# Patient Record
Sex: FEMALE
[De-identification: names, ages, dates, MRNs, and addresses within clinical notes are randomized per-mention and may not be internally consistent; named-entity substitution may affect disease eponyms.]

---
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
## 2023-02-03 ENCOUNTER — APPOINTMENT (OUTPATIENT)
Dept: ULTRASOUND IMAGING | Facility: CLINIC | Age: 18
End: 2023-02-03

## 2023-02-17 ENCOUNTER — APPOINTMENT (OUTPATIENT)
Dept: ULTRASOUND IMAGING | Facility: CLINIC | Age: 18
End: 2023-02-17
Payer: COMMERCIAL

## 2023-02-17 ENCOUNTER — OUTPATIENT (OUTPATIENT)
Dept: OUTPATIENT SERVICES | Facility: HOSPITAL | Age: 18
LOS: 1 days | End: 2023-02-17

## 2023-02-17 PROCEDURE — 76641 ULTRASOUND BREAST COMPLETE: CPT | Mod: 26,LT

## 2023-03-01 ENCOUNTER — APPOINTMENT (OUTPATIENT)
Dept: ULTRASOUND IMAGING | Facility: CLINIC | Age: 18
End: 2023-03-01
Payer: COMMERCIAL

## 2023-03-01 ENCOUNTER — TRANSCRIPTION ENCOUNTER (OUTPATIENT)
Age: 18
End: 2023-03-01

## 2023-03-01 ENCOUNTER — OUTPATIENT (OUTPATIENT)
Dept: OUTPATIENT SERVICES | Facility: HOSPITAL | Age: 18
LOS: 1 days | End: 2023-03-01

## 2023-03-01 PROCEDURE — 19083 BX BREAST 1ST LESION US IMAG: CPT | Mod: LT

## 2023-03-01 PROCEDURE — 88305 TISSUE EXAM BY PATHOLOGIST: CPT | Mod: 26

## 2023-03-02 LAB — SURGICAL PATHOLOGY STUDY: SIGNIFICANT CHANGE UP

## 2023-03-10 ENCOUNTER — APPOINTMENT (OUTPATIENT)
Dept: ENDOCRINOLOGY | Facility: CLINIC | Age: 18
End: 2023-03-10
Payer: COMMERCIAL

## 2023-03-10 VITALS
BODY MASS INDEX: 20.49 KG/M2 | HEIGHT: 64 IN | WEIGHT: 120 LBS | HEART RATE: 70 BPM | SYSTOLIC BLOOD PRESSURE: 102 MMHG | DIASTOLIC BLOOD PRESSURE: 68 MMHG

## 2023-03-10 DIAGNOSIS — Z83.49 FAMILY HISTORY OF OTHER ENDOCRINE, NUTRITIONAL AND METABOLIC DISEASES: ICD-10-CM

## 2023-03-10 DIAGNOSIS — Z78.9 OTHER SPECIFIED HEALTH STATUS: ICD-10-CM

## 2023-03-10 DIAGNOSIS — Z80.1 FAMILY HISTORY OF MALIGNANT NEOPLASM OF TRACHEA, BRONCHUS AND LUNG: ICD-10-CM

## 2023-03-10 PROCEDURE — 99204 OFFICE O/P NEW MOD 45 MIN: CPT | Mod: GC

## 2023-03-10 RX ORDER — AZITHROMYCIN 250 MG/1
250 TABLET, FILM COATED ORAL
Qty: 6 | Refills: 0 | Status: COMPLETED | COMMUNITY
Start: 2022-03-09 | End: 2023-03-10

## 2023-03-10 RX ORDER — CLOTRIMAZOLE AND BETAMETHASONE DIPROPIONATE 10; .5 MG/G; MG/G
1-0.05 CREAM TOPICAL
Qty: 45 | Refills: 0 | Status: COMPLETED | COMMUNITY
Start: 2022-05-20 | End: 2023-03-10

## 2023-03-10 RX ORDER — TRETINOIN 0.25 MG/G
0.03 CREAM TOPICAL
Qty: 45 | Refills: 0 | Status: COMPLETED | COMMUNITY
Start: 2022-03-10 | End: 2023-03-10

## 2023-03-10 RX ORDER — ESCITALOPRAM OXALATE 5 MG/1
TABLET, FILM COATED ORAL
Refills: 0 | Status: COMPLETED | COMMUNITY
End: 2023-03-10

## 2023-03-10 RX ORDER — NORETHINDRONE ACETATE AND ETHINYL ESTRADIOL 1; .02 MG/1; MG/1
1-20 TABLET ORAL DAILY
Qty: 3 | Refills: 3 | Status: COMPLETED | COMMUNITY
End: 2023-03-10

## 2023-03-10 NOTE — REVIEW OF SYSTEMS
[Fatigue] : no fatigue [Recent Weight Gain (___ Lbs)] : no recent weight gain [Recent Weight Loss (___ Lbs)] : no recent weight loss [Fever] : no fever [Chills] : no chills [Visual Field Defect] : no visual field defect [Blurred Vision] : no blurred vision [Dysphagia] : no dysphagia [Dysphonia] : no dysphonia [Nausea] : no nausea [Constipation] : no constipation [Vomiting] : no vomiting [Diarrhea] : no diarrhea [Irregular Menses] : regular menses [Headaches] : no headaches [Tremors] : no tremors

## 2023-03-10 NOTE — PHYSICAL EXAM
[Alert] : alert [Well Nourished] : well nourished [No Acute Distress] : no acute distress [Well Developed] : well developed [Normal Sclera/Conjunctiva] : normal sclera/conjunctiva [EOMI] : extra ocular movement intact [No Proptosis] : no proptosis [Normal Oropharynx] : the oropharynx was normal [Supple] : the neck was supple [Thyroid Not Enlarged] : the thyroid was not enlarged [No Thyroid Nodules] : no palpable thyroid nodules [No Respiratory Distress] : no respiratory distress [No Accessory Muscle Use] : no accessory muscle use [Clear to Auscultation] : lungs were clear to auscultation bilaterally [Normal S1, S2] : normal S1 and S2 [Normal Rate] : heart rate was normal [Regular Rhythm] : with a regular rhythm [No Edema] : no peripheral edema [Normal Bowel Sounds] : normal bowel sounds [Not Tender] : non-tender [Not Distended] : not distended [Soft] : abdomen soft [No Rash] : no rash [Normal Reflexes] : deep tendon reflexes were 2+ and symmetric [Oriented x3] : oriented to person, place, and time [No Stigmata of Cushings Syndrome] : no stigmata of Cushings Syndrome [de-identified] : (+) Mild hand tremor

## 2023-03-10 NOTE — ASSESSMENT
[FreeTextEntry1] : Ms. De Santiago is a 17-year-old female with past medical history of depression (now on remission) who presented to clinic to establish care after she was found to have a 4-mm pituitary lesion in MRI. \par \par # Pituitary lesion\par - MRI of her brain (12/15/2022) showing a normal size of sella turcica and pituitary gland, with a 4 mm lesion within the posterior midline of the pituitary gland, superiorly, no suprasellar extension. Midline infundibulum, Unremarkable suprasellar cistern, optic chiasm and cavernous sinuses. \par - Hormonal testing didn't reveal any significant abnormalities. She has regular menstrual cycles. Disrupted circadian rhythm likely due to poor sleep hygiene and excess caffeine intake. \par - Recheck testosterone, estradiol, TFTs, ACTH and cortisol. \par - Repeat MRI 1 year after previous study. \par \par RTC in December 2023. \par

## 2023-03-10 NOTE — HISTORY OF PRESENT ILLNESS
[FreeTextEntry1] : Sima De Santiago is a 17-year-old female with no known past medical history who presented to clinic after she was found to have a pituitary lesion in MRI. The patient says that she was experiencing frequent conjunctival injection (3 times over 1 year and a half). She was evaluated by an ophthalmologist at [facility] who obtained an MRI of her brain (12/15/2022) which described a normal size of sella turcica and pituitary gland, with a 4 mm lesion within the posterior midline of the pituitary gland, superiorly, no suprasellar extension. Midline infundibulum, Unremarkable suprasellar cistern, optic chiasm and cavernous sinuses. \par \par The patient was seen by her primary care who obtained labs (1/26/23) showing:\par - Total testosterone 46 (Ref 2-45).\par - TSH 1.10 (Ref 0.5-4.3)\par - Free T4: 1.5 (Ref 0.8-1.4)\par - Estradiol: 507 \par - ACTH 7 (Ref 6-50)\par - Cortisol 6.8 (Collected at 8AM)\par - IGF-1 244 \par - FSH 17.8.\par - LH 72.8.\par - Prolactin 27.9 (Ref 3-30)\par - A1C: 4.7\par \par The patient says that she has been experiencing problems falling sleep. She reports always having problems falling sleep, but it has worsened over the past 1.5 year, now falling sleep around 3-4 AM. She usually wakes up one hour before her classes, which start either at 11 AM or 2 PM. If she could, she would eventually wake up around 1 PM. She was diagnosed with depression 2 years ago, and was treated with lexapro which she took for some time, and stopped on Nov 2021. She reports having increased anxiety but that has been going on as long as she can remember. She has regular menstrual cycles every ~3 weeks, last for 4-5 days. She denied using any birth control method. She used an OCP in the past discontinued approximately 1 year ago, has not been taking it anymore. She was also recently diagnosed with a fibroadenoma in her breast which was recently biopsied without any malignant features. \par \par She denied experiencing any changes in her weight, tremors, diarrhea. Her mother's family side has thyroid disease, great-grandmother had graves disease and aunt and uncle have hypothyroidism. Otherwise, she denied having any other autoimmune conditions in the family. She denied taking amiodarone or lithium. Denied radiation exposure. She usually drinks a lot of caffeine, the equivalent of ~4 expresso shots, gets headaches without it.

## 2023-03-13 ENCOUNTER — NON-APPOINTMENT (OUTPATIENT)
Age: 18
End: 2023-03-13

## 2023-03-14 LAB
ACTH SER-ACNC: 18.6 PG/ML
CORTIS SERPL-MCNC: 10.3 UG/DL
ESTRADIOL SERPL-MCNC: 43 PG/ML
T3 SERPL-MCNC: 146 NG/DL
T4 FREE SERPL-MCNC: 1.5 NG/DL
T4 SERPL-MCNC: 9.5 UG/DL
TESTOST FREE SERPL-MCNC: 2.4 PG/ML
TESTOST SERPL-MCNC: 42.8 NG/DL
TSH SERPL-ACNC: 1 UIU/ML

## 2023-03-16 ENCOUNTER — NON-APPOINTMENT (OUTPATIENT)
Age: 18
End: 2023-03-16

## 2023-06-15 ENCOUNTER — APPOINTMENT (OUTPATIENT)
Dept: OBGYN | Facility: CLINIC | Age: 18
End: 2023-06-15
Payer: COMMERCIAL

## 2023-06-15 VITALS
HEIGHT: 64 IN | BODY MASS INDEX: 20.49 KG/M2 | SYSTOLIC BLOOD PRESSURE: 108 MMHG | WEIGHT: 120 LBS | DIASTOLIC BLOOD PRESSURE: 70 MMHG

## 2023-06-15 LAB
HCG UR QL: NEGATIVE
QUALITY CONTROL: YES

## 2023-06-15 PROCEDURE — 99395 PREV VISIT EST AGE 18-39: CPT

## 2023-06-15 PROCEDURE — 81025 URINE PREGNANCY TEST: CPT

## 2023-06-15 NOTE — HISTORY OF PRESENT ILLNESS
[FreeTextEntry1] : ALONDRA THOMPSON is a 17 year F G0  here for annual. 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 Patient is sexually active with different partner from last annual - denies any issues. Denies discahrge, pain. She reports q6months she can get a little bump on her vulva that goes away after a week- currently does not have it. \par Reports stoppng ocps and then restarting it two months - bleeding twice a month the last two months\par \par BC: junel 1/20\par Gynhx: Reg menses, No h/o STIs/fibroids/cysts/abn paps, columba has h/o left breast cyst who she follows with pediatric surgeon at [facility]. It was never biopsied, just followed by sonogram and was stable in size.\par Obhx:nullip

## 2023-06-15 NOTE — DISCUSSION/SUMMARY
[FreeTextEntry1] : annual\par decline exam\par gc/ch sent\par rx for junel 1/20 sent\par aub likely from OCP restart - if not improved in 2 months - advised patient to reach out\par for vulvar bump - advised to make an apppointment when it is present for it to be evaluated

## 2023-07-11 ENCOUNTER — NON-APPOINTMENT (OUTPATIENT)
Age: 18
End: 2023-07-11

## 2023-12-13 ENCOUNTER — APPOINTMENT (OUTPATIENT)
Dept: ENDOCRINOLOGY | Facility: CLINIC | Age: 18
End: 2023-12-13
Payer: COMMERCIAL

## 2023-12-13 VITALS — DIASTOLIC BLOOD PRESSURE: 70 MMHG | SYSTOLIC BLOOD PRESSURE: 115 MMHG | HEART RATE: 80 BPM | WEIGHT: 119 LBS

## 2023-12-13 DIAGNOSIS — Z11.3 ENCOUNTER FOR SCREENING FOR INFECTIONS WITH A PREDOMINANTLY SEXUAL MODE OF TRANSMISSION: ICD-10-CM

## 2023-12-13 DIAGNOSIS — Z00.00 ENCOUNTER FOR GENERAL ADULT MEDICAL EXAMINATION W/OUT ABNORMAL FINDINGS: ICD-10-CM

## 2023-12-13 DIAGNOSIS — Z01.419 ENCOUNTER FOR GYNECOLOGICAL EXAMINATION (GENERAL) (ROUTINE) W/OUT ABNORMAL FINDINGS: ICD-10-CM

## 2023-12-13 PROCEDURE — 99214 OFFICE O/P EST MOD 30 MIN: CPT | Mod: GC

## 2023-12-13 NOTE — PHYSICAL EXAM
[Alert] : alert [Well Nourished] : well nourished [No Acute Distress] : no acute distress [Well Developed] : well developed [Normal Sclera/Conjunctiva] : normal sclera/conjunctiva [EOMI] : extra ocular movement intact [Visual Fields Grossly Intact] : visual fields grossly intact [No Proptosis] : no proptosis [Normal Oropharynx] : the oropharynx was normal [Supple] : the neck was supple [Thyroid Not Enlarged] : the thyroid was not enlarged [No Thyroid Nodules] : no palpable thyroid nodules [No Respiratory Distress] : no respiratory distress [No Accessory Muscle Use] : no accessory muscle use [Clear to Auscultation] : lungs were clear to auscultation bilaterally [Normal S1, S2] : normal S1 and S2 [Normal Rate] : heart rate was normal [Regular Rhythm] : with a regular rhythm [No Edema] : no peripheral edema [Normal Bowel Sounds] : normal bowel sounds [Not Tender] : non-tender [Not Distended] : not distended [Soft] : abdomen soft [No Stigmata of Cushings Syndrome] : no stigmata of Cushings Syndrome [No Rash] : no rash [Oriented x3] : oriented to person, place, and time

## 2023-12-20 NOTE — HISTORY OF PRESENT ILLNESS
[FreeTextEntry1] : Sima De Santiago is a 18-year-old female with a pituitary lesion who presented to the clinic for follow-up.   HISTORY OF PITUITARY LESION  The patient says that she was experiencing frequent conjunctival injection (3 times over 1 year and a half). She was evaluated by an ophthalmologist at [facility] who obtained an MRI of her brain (12/15/2022), which described a normal size of sella turcica and pituitary gland, with a 4 mm lesion within the posterior midline of the pituitary gland, superiorly, no suprasellar extension. Midline infundibulum, Unremarkable suprasellar cistern, optic chiasm, and cavernous sinuses.   During her last visit, she reported having some problems falling asleep; however, her disrupted circadian rhythm was thought to be likely due to poor sleep hygiene and excess caffeine intake. Her menstrual cycles were regular. Evaluation of her pituitary hormones didn't reveal any excess or deficiency. The plan was for her to repeat an MRI 1 year after her previous study to ensure the stability of the lesion.   Today, she reports that she started birth control pills for the past 6 months and stopped having her menstrual periods around July 2023. She denied having any vision changes on galactorrhea. Denied having excessive urination or headaches. She doesn't have any cushingoid features.

## 2023-12-20 NOTE — ASSESSMENT
[FreeTextEntry1] : # Pituitary lesion - MRI of her brain (12/15/2022) showing a normal size of sella turcica and pituitary gland, with a 4 mm lesion within the posterior midline of the pituitary gland, superiorly, no suprasellar extension. Midline infundibulum, Unremarkable suprasellar cistern, optic chiasm, and cavernous sinuses. - Hormonal testing didn't reveal any significant abnormalities. She reports starting birth control pills since her last visit and stopped having menstrual periods around July 2023. She denied having any vision changes on galactorrhea. Denied having excessive urination or headaches. She doesn't have any cushingoid features. - Recheck ACTH, Cortisol, IGF-1, TSH, and prolactin.  - Check an MRI pituitary with and without contrast.   Case discussed with Dr. Ch.  [facility] in 3 months.

## 2023-12-20 NOTE — REVIEW OF SYSTEMS
[Fatigue] : no fatigue [Decreased Appetite] : appetite not decreased [Recent Weight Gain (___ Lbs)] : no recent weight gain [Recent Weight Loss (___ Lbs)] : no recent weight loss [Fever] : no fever [Visual Field Defect] : no visual field defect [Chest Pain] : no chest pain [Palpitations] : no palpitations [Shortness Of Breath] : no shortness of breath [Polyuria] : no polyuria [Headaches] : no headaches

## 2023-12-20 NOTE — END OF VISIT
[] : Fellow [FreeTextEntry3] : Hx of pituitary incidentaloma, a 4 mm lesion within the posterior midline. Anterior pituitary hormones levels are normal. Plan as outlined by Dr. Washington in his A/P [Time Spent: ___ minutes] : I have spent [unfilled] minutes of time on the encounter.

## 2024-02-16 ENCOUNTER — NON-APPOINTMENT (OUTPATIENT)
Age: 19
End: 2024-02-16

## 2024-04-22 RX ORDER — NORETHINDRONE ACETATE AND ETHINYL ESTRADIOL 1; 20 MG/1; UG/1
1-20 TABLET ORAL
Qty: 1 | Refills: 2 | Status: ACTIVE | COMMUNITY
Start: 2023-06-15

## 2024-04-24 ENCOUNTER — APPOINTMENT (OUTPATIENT)
Dept: ENDOCRINOLOGY | Facility: CLINIC | Age: 19
End: 2024-04-24
Payer: COMMERCIAL

## 2024-04-24 VITALS — SYSTOLIC BLOOD PRESSURE: 113 MMHG | DIASTOLIC BLOOD PRESSURE: 75 MMHG | HEART RATE: 93 BPM | WEIGHT: 119 LBS

## 2024-04-24 DIAGNOSIS — Z30.09 ENCOUNTER FOR OTHER GENERAL COUNSELING AND ADVICE ON CONTRACEPTION: ICD-10-CM

## 2024-04-24 DIAGNOSIS — E24.9 CUSHING'S SYNDROME, UNSPECIFIED: ICD-10-CM

## 2024-04-24 DIAGNOSIS — R79.89 OTHER SPECIFIED ABNORMAL FINDINGS OF BLOOD CHEMISTRY: ICD-10-CM

## 2024-04-24 DIAGNOSIS — E23.7 DISORDER OF PITUITARY GLAND, UNSPECIFIED: ICD-10-CM

## 2024-04-24 PROCEDURE — 99213 OFFICE O/P EST LOW 20 MIN: CPT | Mod: GC

## 2024-04-24 RX ORDER — DEXAMETHASONE 1 MG/1
1 TABLET ORAL
Qty: 1 | Refills: 0 | Status: ACTIVE | COMMUNITY
Start: 2024-04-24 | End: 1900-01-01

## 2024-04-25 PROBLEM — Z30.09 BIRTH CONTROL COUNSELING: Status: ACTIVE | Noted: 2021-05-06

## 2024-04-25 PROBLEM — R79.89 ELEVATED PROLACTIN LEVEL: Status: ACTIVE | Noted: 2024-02-16

## 2024-05-01 NOTE — END OF VISIT
[] : Fellow [FreeTextEntry3] : Young woman wih hx of a 4 mm lesion within the posterior midline of the pituitary gland described as possible Rathke's cyst versus cystic adenoma. Suspected hypercortisolism. Plan A 24-hour urine free cortisol [Time Spent: ___ minutes] : I have spent [unfilled] minutes of time on the encounter.

## 2024-05-01 NOTE — ASSESSMENT
[FreeTextEntry1] : Ms. De Santiago is a 18-year-old female with a pituitary lesion who presented to the clinic for follow-up.   # Pituitary lesion / Rule-out Cushing syndrome - She had an incidental finding of a 4 mm lesion within the posterior midline of the pituitary gland described as possible Rathke's cyst versus cystic adenoma while undergoing workup for recurrent conjunctival injections. The lesion has remained stable over the past year. Her initial hormonal work-up was negative for any hormonal excess or deficiency. However, upon repeat testing, labs are now remarkable for elevated levels of random cortisol and ACTH. A 24-hour urine free cortisol was collected as the patient was on birth control pills with results still pending (because serum assays measure total cortisol, and false-positive rates for the overnight dexamethasone suppression test are seen in 50% of women taking the oral contraceptive pills).  - The elevated random ACTH and cortisol levels suggest possible autonomous cortisol secretion. The results of the 24-hour urine free cortisol are still pending. Nonetheless, if this is elevated, the persistently high levels of ACTH would suggest the diagnosis of ACTH-mediated Cushing syndrome, likely originating from the pituitary lesion.  - I instructed the patient to stop birth control pills (and to switch to barrier contraception) to ensure that further testing is accurate. Obtain a 1 mg dexamethasone suppression test and 2 late-night salivary cortisol levels 6 weeks after stopping oral contraceptive pills. Additionally, check IGF-1 levels.   Case discussed with Dr. Ch.  C in 2 months.

## 2024-05-01 NOTE — REVIEW OF SYSTEMS
[Anxiety] : anxiety [Fatigue] : no fatigue [Recent Weight Gain (___ Lbs)] : no recent weight gain [Recent Weight Loss (___ Lbs)] : no recent weight loss [Fever] : no fever [Chills] : no chills [Oral Ulcers] : no oral ulcers [Chest Pain] : no chest pain [Palpitations] : no palpitations [Shortness Of Breath] : no shortness of breath [Nausea] : no nausea [Constipation] : no constipation [Abdominal Pain] : no abdominal pain [Vomiting] : no vomiting [Diarrhea] : no diarrhea [Muscle Weakness] : no muscle weakness [Hirsutism] : no hirsutism [Galactorrhea] : no galactorrhea  [Easy Bleeding] : no ~M tendency for easy bleeding [Easy Bruising] : no tendency for easy bruising [Swelling] : no swelling [FreeTextEntry4] : No hyperpigmentation in oral mucosa.

## 2024-05-01 NOTE — HISTORY OF PRESENT ILLNESS
[FreeTextEntry1] : Sima De Santiago is a 18-year-old female with a pituitary lesion who presented to the clinic for follow-up.   HISTORY OF PITUITARY LESION  The patient was experiencing frequent conjunctival injections. She was evaluated by an ophthalmologist at [facility] who obtained an MRI of her brain (12/15/2022), which described a normal size of sella turcica and pituitary gland, with a 4 mm lesion within the posterior midline of the pituitary gland, superiorly, no suprasellar extension. Midline infundibulum, Unremarkable suprasellar cistern, optic chiasm, and cavernous sinuses.   During her initial encounter, she reported having some problems falling asleep; however, her disrupted circadian rhythm was thought to be likely due to poor sleep hygiene and excess caffeine intake. Her menstrual cycles were regular. Evaluation of her pituitary hormones didn't reveal any excess or deficiency. Upon follow-up on 12/2023, she reported starting birth control pills around June 2023 and mentioned that her menstrual periods stopped around July 2023. She denied having any vision changes on galactorrhea. Denied having excessive urination or headaches. She didn't have any cushingoid features.   A follow-up MRI pituitary with and without contrast (1/31/2024) revealed that her lesion has remained stable (3 mm focus of hypoenhancing T2 hyperintensity in the posterior superior midline pituitary gland, possibly Rathke's cyst versus cystic adenoma). 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 However, her labs (2/16/24) were remarkable for significantly elevated morning cortisol (32 mcg/dL), ACTH (76 pg/mL), and prolactin (56.1 ng/mL). Repeat labs (4/17/24) showed persistently elevated levels of ACTH (60 pg/mL) with monomeric prolactin at 27.8 ng/mL. Free T4 was within normal limits, and hCG was negative. The results of a 24-hour urinary free cortisol collection are still pending.   Today, she continues to deny any signs or symptoms of hypercortisolism. Her weight has remained stable. She continues to have no periods while on OCPs.

## 2024-07-29 ENCOUNTER — NON-APPOINTMENT (OUTPATIENT)
Age: 19
End: 2024-07-29

## 2024-07-29 ENCOUNTER — APPOINTMENT (OUTPATIENT)
Dept: ENDOCRINOLOGY | Facility: CLINIC | Age: 19
End: 2024-07-29

## 2024-07-29 NOTE — HISTORY OF PRESENT ILLNESS
[FreeTextEntry1] : Sima De Santiago is a 18-year-old female with a pituitary lesion who presented to the clinic for follow-up.   HISTORY OF PITUITARY LESION  The patient was experiencing frequent conjunctival injections. She was evaluated by an ophthalmologist at [facility] who obtained an MRI of her brain (12/15/2022), which described a normal size of sella turcica and pituitary gland, with a 4 mm lesion within the posterior midline of the pituitary gland, superiorly, no suprasellar extension. Midline infundibulum, Unremarkable suprasellar cistern, optic chiasm, and cavernous sinuses.   During her initial encounter, she reported having some problems falling asleep; however, her disrupted circadian rhythm was thought to be likely due to poor sleep hygiene and excess caffeine intake. Her menstrual cycles were regular. Evaluation of her pituitary hormones didn't reveal any excess or deficiency. Upon follow-up on 12/2023, she reported starting birth control pills around June 2023 and mentioned that her menstrual periods stopped around July 2023. She denied having any vision changes on galactorrhea. Denied having excessive urination or headaches. She didn't have any cushingoid features.   A follow-up MRI pituitary with and without contrast (1/31/2024) revealed that her lesion has remained stable (3 mm focus of hypoenhancing T2 hyperintensity in the posterior superior midline pituitary gland, possibly Rathke's cyst versus cystic adenoma). 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 However, her labs (2/16/24) were remarkable for significantly elevated random morning cortisol (32 mcg/dL), ACTH (76 pg/mL), and prolactin (56.1 ng/mL). Repeat labs (4/17/24) showed that her actual monomeric prolactin was lower at 27.8 ng/mL. TSH, Free T4 and IGF-1 were within normal limits, and hCG was negative.   Given the elevated random cortisol level, a 24-hour urinary free cortisol and a dexamethasone suppression test were ordered. The 24-hour urine collection was remarkable for a normal free urine cortisol at 39.4 mcg/24 hours. Additionally, her cortisol suppressed appropriately to 0.7 ug/dL after receiving 1 mg of dexamethasone the night before (4 weeks after stopping her oral contraceptive pill to ensure didn't affect the cortisol binding globulin levels leading to higher total cortisol values).

## 2024-07-29 NOTE — ASSESSMENT
[FreeTextEntry1] : Ms. De Santiago is a 18-year-old female with a pituitary lesion who presented to the clinic for follow-up.   # Pituitary microincidentaloma - She was found to have an incidental finding of a 4 mm lesion within the posterior midline of the pituitary gland described as possible Rathke's cyst versus cystic adenoma while undergoing workup for recurrent conjunctival injections. The lesion has remained stable on a follow-up MRI one year later.  - Her initial hormonal work-up was negative for any hormonal excess or deficiency. Upon repeat testing, labs were remarkable for an elevated random cortisol. However, a follow-up 24-hour urine free cortisol and a 1-mg dexamethasone suppression test (obtained 4 weeks after stopping oral contraceptive pills) ruled out Cushing's disease.  - The patient was assured that there is no evidence of hormonal excess or deficiency at this point. As per the Endocrine Society Guidelines on pituitary incidentalomas, no further biochemical evaluations for hypopituitarism are needed as long as her clinical picture, history, and MRI do not change over time.  - The plan is to obtain a repeat MRI in December 2024. If imaging remains stable, will then start to monitor less frequently.   RTC in January 2025

## 2024-09-05 ENCOUNTER — RX RENEWAL (OUTPATIENT)
Age: 19
End: 2024-09-05

## 2025-06-13 ENCOUNTER — OUTPATIENT (OUTPATIENT)
Dept: OUTPATIENT SERVICES | Facility: HOSPITAL | Age: 20
LOS: 1 days | End: 2025-06-13
Payer: COMMERCIAL

## 2025-06-13 ENCOUNTER — APPOINTMENT (OUTPATIENT)
Dept: MRI IMAGING | Facility: CLINIC | Age: 20
End: 2025-06-13
Payer: COMMERCIAL

## 2025-06-13 DIAGNOSIS — E23.7 DISORDER OF PITUITARY GLAND, UNSPECIFIED: ICD-10-CM

## 2025-06-13 PROCEDURE — 70553 MRI BRAIN STEM W/O & W/DYE: CPT

## 2025-06-13 PROCEDURE — A9585: CPT

## 2025-06-13 PROCEDURE — 70553 MRI BRAIN STEM W/O & W/DYE: CPT | Mod: 26

## 2025-06-27 ENCOUNTER — APPOINTMENT (OUTPATIENT)
Dept: ENDOCRINOLOGY | Facility: CLINIC | Age: 20
End: 2025-06-27
Payer: COMMERCIAL

## 2025-06-27 VITALS — WEIGHT: 130 LBS | SYSTOLIC BLOOD PRESSURE: 108 MMHG | DIASTOLIC BLOOD PRESSURE: 67 MMHG | HEART RATE: 102 BPM

## 2025-06-27 PROCEDURE — 99213 OFFICE O/P EST LOW 20 MIN: CPT
